# Patient Record
(demographics unavailable — no encounter records)

---

## 2025-06-03 NOTE — PHYSICAL EXAM
[Normocephalic] : normocephalic [Atraumatic] : atraumatic [Sclera nonicteric] : sclera nonicteric [Supple] : supple [No Supraclavicular Adenopathy] : no supraclavicular adenopathy [No Cervical Adenopathy] : no cervical adenopathy [Clear to Auscultation Bilat] : clear to auscultation bilaterally [Examined in the supine and seated position] : examined in the supine and seated position [Asymmetrical] : asymmetrical [Grade 2] : Ptosis Grade 2 [No dominant masses] : no dominant masses in right breast  [No dominant masses] : no dominant masses left breast [No Nipple Retraction] : no right nipple retraction [No Nipple Discharge] : no right nipple discharge [No Axillary Lymphadenopathy] : no left axillary lymphadenopathy [Soft] : abdomen soft [No Edema] : no edema [No Swelling] : no swelling [No Rashes] : no rashes [No Ulceration] : no ulceration [Conjunctiva pink] : conjunctiva pink [de-identified] : Curvilinear scar in lateral breast [de-identified] : Transverse scar. 1.5 x 2-3 cm dense scar tissue at medial aspect of L mastectomy scar, unchanged [de-identified] : Transverse scar [de-identified] : Transverse scar [de-identified] : umbilical hernia [de-identified] : Discomfort when raising L arm

## 2025-06-03 NOTE — DATA REVIEWED
[No studies available for review at this time.] : No studies available for review at this time. [FreeTextEntry1] :  Pathology Left mastectomy: 1/3/19   Results:  Infiltrating lobular carcinoma, classic type. Several residual foci from 1 mm to 11 mm.  Focus of perineural invasion.  Margins negative. Stage [id number]  L US 12/21/21 - no mass at L chest wall at L mastectomy site - BIRADS 1  R mammogram and US 12/19/23 - scattered fibroglandular density  - BIRADS 1

## 2025-06-03 NOTE — HISTORY OF PRESENT ILLNESS
[FreeTextEntry1] : Ms. Sorensen is an 85 year old woman here for a follow-up for surveillance for breast cancer as part of survivorship. Her breast history is significant for   1.) Left breast infiltrating lobular carcinoma diagnosed in 2003 at age 64, pathologic stage I, pT1 pN0 - s/p L lumpectomy, sentinel node biopsy (Dr. Yovani Servin, 2003) = 1 cm tumor, 0/1 L sln - s/p radiation  2.) Recurrent left breast infiltrating lobular carcinoma diagnosed in 2018 at age 79, ER 95%, HI 0, Her2 negative 3.) Concurrent right breast infiltrating ductal carcinoma in 2018 at age 79, pathologic stage I, pT1 pN0, ER 90%, HI 90%, Her2 negative, SBR 6/9 - s/p b/l lumpectomy, R sentinel node biopsy (declined L sentinel node biopsy) (11/28/18, Dr. Bustamante) = >5 cm L infiltrating lobular carcinoma with positive margin, 1.0 cm R infiltrating ductal carcinoma, 0/2 R sln - s/p L mastectomy (1/3/19) = several foci of infiltrating lobular carcinoma  - declined radiation to R breast - declined letrozole (Dr. 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 Her family history is significant for breast cancer in her sister in her 40's.   She has no breast complaints. She c/o some tightness near the scar of the left mastectomy site.  She was told she requires a battery change for pacemaker and was advised to undergo an EP study to see if she requires a defibrillator but she is unsure if she would like to proceed with this.  She has continued right knee pain and decreased mobility due to LE edema and peripheral neuropathy.  She is scheduled for a vascular consult for likely venous disease and is also meeting with a lymphedema specialist.  She met with Dr. Kaufman from  [facility] but did not follow up.  There is no record of her undergoing right mammogram and ultrasound in 2024 but she believes she did go for this.

## 2025-06-03 NOTE — ASSESSMENT
[FreeTextEntry1] : Ms. Sorensen is an 85 year old woman - 22 years s/p L lumpectomy for a stage I infiltrating lobular carcinoma  - 6 years s/p L lumpectomy then mastectomy for recurrence of infiltrating lobular carcinoma  - 6 years s/p R lumpectomy for a stage I infiltrating ductal carcinoma   Her breast exam today is without suspicious findings. Scripts for post-mastectomy bras and breast prosthesis are provided along with an updated list of boutiques as the one she previously went to has closed.  She believes she underwent right mammo/US last year - this is unavailable at the time of this visit (our office will follow up on this).   As part of survivorship program we discussed guidelines on nutrition and physical activity for cancer survivors.  These include maintaining a healthy weight or weight loss if necessary, to achieve a BMI between 18-25.  A diet high in vegetables, fruits and whole grains is encouraged.  It is recommended to avoid inactivity and engage in regular physical activity (minimum of 150 minutes /week + strength training minimum 2 days /week) with a goal of improving quality of life, physical functioning, peak oxygen consumption and reduce symptoms of fatigue.  Also discussed limiting alcohol consumption to no more than one drink per day (women) and two drinks per day (men).  Avoidance of tobacco and nicotine containing products.  She has difficulty ambulating due to LE lymphedema.  She declines reconsult with [facility] (Dr. Kaufman) at this time as she has been set up with someone to address this by her cardiologist.  She is scheduled to see a vascular physician to evaluate her for venous insufficiency and help treat her LE lymphedema.  She has a pneumatic compression device at home that she finds very painful to use - she thinks it may be too small.  She is looking into getting a new one.  She likewise declines referral at this time for PT to help with post mastectomy discomfort but states she has the exercises that were prescribed at home and plans to do these.  After she undergoes pacemaker surgery and follow up with lymphedema specialist she will consider PT again and will call our office for prescription for this.   She is advised to follow up with her primary care regarding hernia especially should she have abdominal discomfort. She declines referral to surgeon today - as she has too many doctors appointments.